# Patient Record
Sex: FEMALE | Race: WHITE | NOT HISPANIC OR LATINO | URBAN - METROPOLITAN AREA
[De-identification: names, ages, dates, MRNs, and addresses within clinical notes are randomized per-mention and may not be internally consistent; named-entity substitution may affect disease eponyms.]

---

## 2018-09-06 ENCOUNTER — APPOINTMENT (RX ONLY)
Dept: URBAN - METROPOLITAN AREA CLINIC 163 | Facility: CLINIC | Age: 28
Setting detail: DERMATOLOGY
End: 2018-09-06

## 2018-09-06 DIAGNOSIS — L21.8 OTHER SEBORRHEIC DERMATITIS: ICD-10-CM

## 2018-09-06 DIAGNOSIS — L81.0 POSTINFLAMMATORY HYPERPIGMENTATION: ICD-10-CM

## 2018-09-06 PROBLEM — L30.9 DERMATITIS, UNSPECIFIED: Status: ACTIVE | Noted: 2018-09-06

## 2018-09-06 PROCEDURE — ? PRESCRIPTION

## 2018-09-06 PROCEDURE — ? COUNSELING

## 2018-09-06 PROCEDURE — ? ADDITIONAL NOTES

## 2018-09-06 PROCEDURE — ? IN-HOUSE DISPENSING PHARMACY

## 2018-09-06 PROCEDURE — 99202 OFFICE O/P NEW SF 15 MIN: CPT

## 2018-09-06 RX ORDER — TRIAMCINOLONE ACETONIDE 1 MG/G
CREAM TOPICAL
Qty: 1 | Refills: 3 | Status: ERX | COMMUNITY
Start: 2018-09-06

## 2018-09-06 RX ADMIN — TRIAMCINOLONE ACETONIDE: 1 CREAM TOPICAL at 12:20

## 2018-09-06 ASSESSMENT — LOCATION ZONE DERM
LOCATION ZONE: VULVA
LOCATION ZONE: EAR

## 2018-09-06 ASSESSMENT — LOCATION DETAILED DESCRIPTION DERM
LOCATION DETAILED: MONS PUBIS
LOCATION DETAILED: RIGHT POSTERIOR EAR

## 2018-09-06 ASSESSMENT — LOCATION SIMPLE DESCRIPTION DERM
LOCATION SIMPLE: GROIN
LOCATION SIMPLE: RIGHT EAR

## 2018-09-06 NOTE — PROCEDURE: IN-HOUSE DISPENSING PHARMACY
Product 15 Units Dispensed: 0
Product 51 Unit Type: mg
Name Of Product 5: Melamix
Product 3 Price/Unit (In Dollars): 90
Product 4 Units Dispensed: 1
Render Refills If Set To 0: Yes
Product 3 Application Directions: Apply to face at bedtime
Product 4 Application Directions: Apply thin layer in the evening
Name Of Product 1: Tretinoin 0.025%
Product 5 Application Directions: Apply twice daily
Product 4 Price/Unit (In Dollars): 66
Product 5 Amount/Unit (Numbers Only): 80
Product 1 Application Directions: Apply QHS face
Name Of Product 3: Tretinoin 0.1% cream
Name Of Product 2: Tretinoin 0.05% cream
Detail Level: Zone
Name Of Product 4: Melamin Cream
Product 4 Unit Type: ml
Product 5 Price/Unit (In Dollars): 64
Product 1 Price/Unit (In Dollars): 45
Product 2 Price/Unit (In Dollars): 60
Product 1 Unit Type: tube(s)

## 2018-09-06 NOTE — PROCEDURE: ADDITIONAL NOTES
Additional Notes: Positive family history of Psoriasis (sister).\\nNo other skin changes\\nNo nail changes\\nNo joint disease
Additional Notes: Recommend select shave method on this area.

## 2018-09-06 NOTE — HPI: RASH
How Severe Is Your Rash?: moderate
Is This A New Presentation, Or A Follow-Up?: Rash
Additional History: Pt refers rash comes and goes. Hydrocortisone OTC.

## 2018-09-06 NOTE — HPI: SCAR (COMPLEX), GENITAL
How Severe Is The Scar?: mild
What Are Your Concerns? (Check All That Apply): discoloration
Is This A New Presentation, Or A Follow-Up?: Genital Scar